# Patient Record
Sex: MALE | Race: OTHER | Employment: FULL TIME | URBAN - METROPOLITAN AREA
[De-identification: names, ages, dates, MRNs, and addresses within clinical notes are randomized per-mention and may not be internally consistent; named-entity substitution may affect disease eponyms.]

---

## 2018-01-08 ENCOUNTER — HOSPITAL ENCOUNTER (EMERGENCY)
Facility: HOSPITAL | Age: 33
Discharge: HOME | End: 2018-01-08
Attending: EMERGENCY MEDICINE

## 2018-01-08 ENCOUNTER — APPOINTMENT (OUTPATIENT)
Dept: CT IMAGING | Facility: HOSPITAL | Age: 33
End: 2018-01-08
Attending: PHYSICIAN ASSISTANT

## 2018-01-08 VITALS
WEIGHT: 215 LBS | HEIGHT: 70 IN | DIASTOLIC BLOOD PRESSURE: 72 MMHG | HEART RATE: 64 BPM | SYSTOLIC BLOOD PRESSURE: 128 MMHG | RESPIRATION RATE: 16 BRPM | OXYGEN SATURATION: 97 % | BODY MASS INDEX: 30.78 KG/M2 | TEMPERATURE: 98 F

## 2018-01-08 DIAGNOSIS — G51.0 BELL'S PALSY: Primary | ICD-10-CM

## 2018-01-08 LAB
ALBUMIN SERPL-MCNC: 3.8 G/DL (ref 3.5–4.8)
ALP LIVER SERPL-CCNC: 79 U/L (ref 45–117)
ALT SERPL-CCNC: 51 U/L (ref 17–63)
AST SERPL-CCNC: 31 U/L (ref 15–41)
BASOPHILS # BLD AUTO: 0.08 X10(3) UL (ref 0–0.1)
BASOPHILS NFR BLD AUTO: 0.6 %
BILIRUB SERPL-MCNC: 0.4 MG/DL (ref 0.1–2)
BUN BLD-MCNC: 8 MG/DL (ref 8–20)
CALCIUM BLD-MCNC: 9.1 MG/DL (ref 8.3–10.3)
CHLORIDE: 107 MMOL/L (ref 101–111)
CO2: 26 MMOL/L (ref 22–32)
CREAT BLD-MCNC: 0.88 MG/DL (ref 0.7–1.3)
EOSINOPHIL # BLD AUTO: 0.49 X10(3) UL (ref 0–0.3)
EOSINOPHIL NFR BLD AUTO: 3.9 %
ERYTHROCYTE [DISTWIDTH] IN BLOOD BY AUTOMATED COUNT: 12 % (ref 11.5–16)
GLUCOSE BLD-MCNC: 103 MG/DL (ref 70–99)
HCT VFR BLD AUTO: 49.3 % (ref 37–53)
HGB BLD-MCNC: 17 G/DL (ref 13–17)
IMMATURE GRANULOCYTE COUNT: 0.04 X10(3) UL (ref 0–1)
IMMATURE GRANULOCYTE RATIO %: 0.3 %
LYMPHOCYTES # BLD AUTO: 2.06 X10(3) UL (ref 0.9–4)
LYMPHOCYTES NFR BLD AUTO: 16.4 %
M PROTEIN MFR SERPL ELPH: 7.2 G/DL (ref 6.1–8.3)
MCH RBC QN AUTO: 30.2 PG (ref 27–33.2)
MCHC RBC AUTO-ENTMCNC: 34.5 G/DL (ref 31–37)
MCV RBC AUTO: 87.6 FL (ref 80–99)
MONOCYTES # BLD AUTO: 0.69 X10(3) UL (ref 0.1–0.6)
MONOCYTES NFR BLD AUTO: 5.5 %
NEUTROPHIL ABS PRELIM: 9.22 X10 (3) UL (ref 1.3–6.7)
NEUTROPHILS # BLD AUTO: 9.22 X10(3) UL (ref 1.3–6.7)
NEUTROPHILS NFR BLD AUTO: 73.3 %
PLATELET # BLD AUTO: 238 10(3)UL (ref 150–450)
POTASSIUM SERPL-SCNC: 4.2 MMOL/L (ref 3.6–5.1)
RBC # BLD AUTO: 5.63 X10(6)UL (ref 4.3–5.7)
RED CELL DISTRIBUTION WIDTH-SD: 38.3 FL (ref 35.1–46.3)
SODIUM SERPL-SCNC: 140 MMOL/L (ref 136–144)
WBC # BLD AUTO: 12.6 X10(3) UL (ref 4–13)

## 2018-01-08 PROCEDURE — 36415 COLL VENOUS BLD VENIPUNCTURE: CPT

## 2018-01-08 PROCEDURE — 99284 EMERGENCY DEPT VISIT MOD MDM: CPT

## 2018-01-08 PROCEDURE — 99285 EMERGENCY DEPT VISIT HI MDM: CPT

## 2018-01-08 PROCEDURE — 85025 COMPLETE CBC W/AUTO DIFF WBC: CPT | Performed by: PHYSICIAN ASSISTANT

## 2018-01-08 PROCEDURE — 70450 CT HEAD/BRAIN W/O DYE: CPT | Performed by: PHYSICIAN ASSISTANT

## 2018-01-08 PROCEDURE — 80053 COMPREHEN METABOLIC PANEL: CPT | Performed by: PHYSICIAN ASSISTANT

## 2018-01-08 RX ORDER — PREDNISONE 20 MG/1
60 TABLET ORAL DAILY
Qty: 15 TABLET | Refills: 0 | Status: SHIPPED | OUTPATIENT
Start: 2018-01-08 | End: 2018-01-13

## 2018-01-08 RX ORDER — FAMCICLOVIR 500 MG/1
500 TABLET, FILM COATED ORAL 3 TIMES DAILY
Qty: 21 TABLET | Refills: 0 | Status: SHIPPED | OUTPATIENT
Start: 2018-01-08 | End: 2018-01-15

## 2018-01-08 NOTE — ED INITIAL ASSESSMENT (HPI)
Numbness/tingling to left side of face. Started yest AM  Pt feels discomfort to left side of neck and around left ear  Unable to  Raise left eyebrow -   Hand grasps are equal / bilat.    Smile is symmetrical  No arm drift    Pt started taking prednisone yes

## 2018-01-08 NOTE — ED PROVIDER NOTES
Patient Seen in: [facility] Emergency Department    History   Patient presents with:  Numbness Weakness (neurologic)  Bell's Palsy    Stated Complaint: bells palsy    HPI    Thuan Hallmark is a 66-year-old male who presents today from an urgent care diagnos Effort normal and breath sounds normal.   Abdominal: Soft. Bowel sounds are normal.   Neurological: He is alert and oriented to person, place, and time. He has normal strength. No sensory deficit. He displays a negative Romberg sign.    Patient is unable to which includes the Dose Index Registry. PATIENT STATED HISTORY: (As transcribed by Technologist)  Numbness to left side of face with fever and headache    FINDINGS:  VENTRICLES/SULCI:  Ventricles and sulci are normal in size.   INTRACRANIAL:  There are no PM    START taking these medications    predniSONE 20 MG Oral Tab  Take 3 tablets (60 mg total) by mouth daily. , Print Script, Disp-15 tablet, R-0    Famciclovir 500 MG Oral Tab  Take 1 tablet (500 mg total) by mouth 3 (three) times daily. , Print Script, D

## 2018-01-08 NOTE — ED PROVIDER NOTES
I reviewed that chart and discussed the case. I have examined the patient and noted findings of Bell's palsy. The patient's symptoms started yesterday. The patient states that he is unable to raise his left eyebrow.   Some tingling in his left side of hi of acute inflammation. SKULL:             No evidence for fracture or osseous abnormality. OTHER:             None. CONCLUSION:  No acute intracranial abnormality is seen. If clinical symptoms persist recommend MRI.     Dictated by: Parul Hanson MD on 1

## 2018-01-11 ENCOUNTER — TELEPHONE (OUTPATIENT)
Dept: FAMILY MEDICINE CLINIC | Facility: CLINIC | Age: 33
End: 2018-01-11

## 2018-01-11 NOTE — TELEPHONE ENCOUNTER
Cliff Foote DO at 1/8/2018  2:23 PM     Status: Signed      Pls call and see if pt needs PCP in the area for follow up of ER visit today. Seen at ER for Mellott' Palsy.     Routed to front office to contact patient to offer ER f/u

## 2018-02-23 PROBLEM — F29 PSYCHOSIS (HCC): Status: ACTIVE | Noted: 2018-02-23

## 2018-02-23 NOTE — ED PROVIDER NOTES
LEFT V. MAIL FOR ADMIN @ [facility] #:  [phone], x [phone] )    INFO FAXED [phone] & [phone]    AWAITING C/B.

## 2018-02-23 NOTE — ED NOTES
Patient is calm and cooperative at this time, watching television. Verbalizes decrease in anxiety following Ativan.
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
## 2018-02-23 NOTE — ED PROVIDER NOTES
Patient Seen in: [facility] Emergency Department    History   Patient presents with:  Eval-P (psychiatric)    Stated Complaint: eval p, increasing anxiety/depression, started on sertraline by PMD yesterday    HPI    Patient presents with suicidal an guarding, normal active bowel sounds, no CVA tenderness. Extremities: No CCE. Skin: Warm and dry.   Neurologic: Nonfocal.    ED Course     Labs Reviewed   CBC W/ DIFFERENTIAL - Abnormal; Notable for the following:        Result Value    RBC 5.72 (*)     H Discharge Medication List
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
## 2018-02-23 NOTE — ED NOTES
Patient undressed and dressed into behavioral health gown. All belongings including pants, underwear, socks, shoes, shirt, sweatshirt, and phone placed in security bag #[id number] and locked in locker outside B4.  Patient and family given education on seclusi
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
## 2018-02-24 PROBLEM — F41.8 OTHER SPECIFIED ANXIETY DISORDERS: Status: ACTIVE | Noted: 2018-02-24

## 2018-02-24 PROBLEM — F32.2 CURRENT SEVERE EPISODE OF MAJOR DEPRESSIVE DISORDER WITHOUT PSYCHOTIC FEATURES WITHOUT PRIOR EPISODE (HCC): Status: ACTIVE | Noted: 2018-02-23

## 2018-02-24 NOTE — ED NOTES
Report from [facility] received. Pt remains resting comfortably and awaiting ed bls to facility.

## 2018-02-24 NOTE — ED NOTES
Patient updated on transfer and timeframe. Report given to [facility] from [facility].

## 2018-02-24 NOTE — ED NOTES
Called [facility] regarding late transfer. .. Crew has just arrived and is dropping off a pt in the ER.  Crew stated that they are unaware of pickup in the ER

## 2018-03-20 PROBLEM — F33.2 MAJOR DEPRESSIVE DISORDER, RECURRENT EPISODE, SEVERE (HCC): Status: ACTIVE | Noted: 2018-03-20

## 2018-03-22 PROBLEM — F32.2 CURRENT SEVERE EPISODE OF MAJOR DEPRESSIVE DISORDER WITHOUT PSYCHOTIC FEATURES WITHOUT PRIOR EPISODE (HCC): Status: RESOLVED | Noted: 2018-02-23 | Resolved: 2018-03-22

## 2018-03-22 PROBLEM — F32.9 MAJOR DEPRESSION: Status: ACTIVE | Noted: 2018-03-20

## 2018-11-08 ENCOUNTER — HOSPITAL ENCOUNTER (EMERGENCY)
Facility: HOSPITAL | Age: 33
Discharge: HOME | End: 2018-11-08
Attending: EMERGENCY MEDICINE

## 2018-11-08 ENCOUNTER — APPOINTMENT (OUTPATIENT)
Dept: CT IMAGING | Facility: HOSPITAL | Age: 33
End: 2018-11-08
Attending: EMERGENCY MEDICINE

## 2018-11-08 VITALS
RESPIRATION RATE: 17 BRPM | OXYGEN SATURATION: 98 % | BODY MASS INDEX: 41.52 KG/M2 | WEIGHT: 290 LBS | TEMPERATURE: 97 F | DIASTOLIC BLOOD PRESSURE: 82 MMHG | HEART RATE: 55 BPM | SYSTOLIC BLOOD PRESSURE: 128 MMHG | HEIGHT: 70 IN

## 2018-11-08 DIAGNOSIS — R10.9 ABDOMINAL PAIN OF UNKNOWN ETIOLOGY: Primary | ICD-10-CM

## 2018-11-08 PROCEDURE — 81003 URINALYSIS AUTO W/O SCOPE: CPT | Performed by: EMERGENCY MEDICINE

## 2018-11-08 PROCEDURE — 80053 COMPREHEN METABOLIC PANEL: CPT | Performed by: EMERGENCY MEDICINE

## 2018-11-08 PROCEDURE — 83690 ASSAY OF LIPASE: CPT | Performed by: EMERGENCY MEDICINE

## 2018-11-08 PROCEDURE — 96361 HYDRATE IV INFUSION ADD-ON: CPT

## 2018-11-08 PROCEDURE — 80053 COMPREHEN METABOLIC PANEL: CPT

## 2018-11-08 PROCEDURE — 85025 COMPLETE CBC W/AUTO DIFF WBC: CPT | Performed by: EMERGENCY MEDICINE

## 2018-11-08 PROCEDURE — 74177 CT ABD & PELVIS W/CONTRAST: CPT | Performed by: EMERGENCY MEDICINE

## 2018-11-08 PROCEDURE — 96374 THER/PROPH/DIAG INJ IV PUSH: CPT

## 2018-11-08 PROCEDURE — 85025 COMPLETE CBC W/AUTO DIFF WBC: CPT

## 2018-11-08 PROCEDURE — 99284 EMERGENCY DEPT VISIT MOD MDM: CPT

## 2018-11-08 PROCEDURE — 83690 ASSAY OF LIPASE: CPT

## 2018-11-08 PROCEDURE — 99285 EMERGENCY DEPT VISIT HI MDM: CPT

## 2018-11-08 RX ORDER — DICYCLOMINE HCL 20 MG
20 TABLET ORAL 4 TIMES DAILY PRN
Qty: 30 TABLET | Refills: 0 | Status: SHIPPED | OUTPATIENT
Start: 2018-11-08 | End: 2018-12-08

## 2018-11-08 RX ORDER — PANTOPRAZOLE SODIUM 40 MG/1
40 TABLET, DELAYED RELEASE ORAL DAILY
Qty: 30 TABLET | Refills: 0 | OUTPATIENT
Start: 2018-11-08 | End: 2018-11-08

## 2018-11-08 RX ORDER — PANTOPRAZOLE SODIUM 40 MG/1
40 TABLET, DELAYED RELEASE ORAL DAILY
Qty: 30 TABLET | Refills: 0 | Status: SHIPPED | OUTPATIENT
Start: 2018-11-08 | End: 2018-12-08

## 2018-11-08 RX ORDER — ONDANSETRON 4 MG/1
4 TABLET, ORALLY DISINTEGRATING ORAL EVERY 4 HOURS PRN
Qty: 10 TABLET | Refills: 0 | OUTPATIENT
Start: 2018-11-08 | End: 2018-11-08

## 2018-11-08 RX ORDER — OMEPRAZOLE 20 MG/1
20 CAPSULE, DELAYED RELEASE ORAL
Qty: 30 CAPSULE | Refills: 0 | Status: SHIPPED | OUTPATIENT
Start: 2018-11-08

## 2018-11-08 RX ORDER — DICYCLOMINE HCL 20 MG
20 TABLET ORAL 4 TIMES DAILY PRN
Qty: 30 TABLET | Refills: 0 | OUTPATIENT
Start: 2018-11-08 | End: 2018-11-08

## 2018-11-08 RX ORDER — SODIUM CHLORIDE 9 MG/ML
INJECTION, SOLUTION INTRAVENOUS CONTINUOUS
Status: DISCONTINUED | OUTPATIENT
Start: 2018-11-08 | End: 2018-11-08

## 2018-11-08 RX ORDER — ONDANSETRON 2 MG/ML
4 INJECTION INTRAMUSCULAR; INTRAVENOUS ONCE
Status: COMPLETED | OUTPATIENT
Start: 2018-11-08 | End: 2018-11-08

## 2018-11-08 RX ORDER — ONDANSETRON 4 MG/1
4 TABLET, ORALLY DISINTEGRATING ORAL EVERY 4 HOURS PRN
Qty: 10 TABLET | Refills: 0 | Status: SHIPPED | OUTPATIENT
Start: 2018-11-08 | End: 2018-11-15

## 2018-11-08 RX ORDER — OMEPRAZOLE 20 MG/1
20 CAPSULE, DELAYED RELEASE ORAL
COMMUNITY
End: 2018-11-08

## 2018-11-08 NOTE — ED INITIAL ASSESSMENT (HPI)
Patient to ED c/o right sided abdominal pain and right back pain for 2 months. Reports today it is worse. Saw a doctor on Friday, US and x-ray was completed on Monday, was told everything is ok.   + nausea, denies vomiting. Denies urinary complaints.

## 2018-11-08 NOTE — ED PROVIDER NOTES
Patient Seen in: [facility] Emergency Department    History   Patient presents with:  Abdomen/Flank Pain (GI/)    Stated Complaint: abdominal pain x 2 months, seen by doctor for same on Monday    HPI    2 months of right-sided abdominal pain- outp palpation of the right abdomen and over the right lower quadrant over McBurney's point he is noted to have some rebound.   He does not have any significant CVA tenderness no significant tenderness in the left lower abdomen    ED Course     Labs Reviewed   C Technologist)  Patient with     right upper quadrant pain, right back pain x 2 months, nausea.            CONTRAST USED:  100cc of Omnipaque 350         FINDINGS:      LIVER:  Findings suggestive of fatty infiltration of the liver with focal     areas of fa Clinical Impression:  Abdominal pain of unknown etiology  (primary encounter diagnosis)    Disposition:  Discharge  11/8/2018  4:08 pm    Follow-up:  [facility] Emergency Department  [facility]  [address] [id number]  [phone]

## 2019-04-11 ENCOUNTER — HOSPITAL ENCOUNTER (EMERGENCY)
Facility: HOSPITAL | Age: 34
Discharge: HOME | End: 2019-04-11
Attending: EMERGENCY MEDICINE

## 2019-04-11 ENCOUNTER — APPOINTMENT (OUTPATIENT)
Dept: ULTRASOUND IMAGING | Facility: HOSPITAL | Age: 34
End: 2019-04-11
Attending: EMERGENCY MEDICINE

## 2019-04-11 ENCOUNTER — APPOINTMENT (OUTPATIENT)
Dept: GENERAL RADIOLOGY | Facility: HOSPITAL | Age: 34
End: 2019-04-11
Attending: EMERGENCY MEDICINE

## 2019-04-11 VITALS
WEIGHT: 220 LBS | HEART RATE: 63 BPM | HEIGHT: 68 IN | SYSTOLIC BLOOD PRESSURE: 124 MMHG | DIASTOLIC BLOOD PRESSURE: 72 MMHG | OXYGEN SATURATION: 97 % | RESPIRATION RATE: 16 BRPM | BODY MASS INDEX: 33.34 KG/M2 | TEMPERATURE: 98 F

## 2019-04-11 DIAGNOSIS — R10.11 ABDOMINAL PAIN, RIGHT UPPER QUADRANT: Primary | ICD-10-CM

## 2019-04-11 PROCEDURE — 85025 COMPLETE CBC W/AUTO DIFF WBC: CPT | Performed by: EMERGENCY MEDICINE

## 2019-04-11 PROCEDURE — 96374 THER/PROPH/DIAG INJ IV PUSH: CPT | Performed by: EMERGENCY MEDICINE

## 2019-04-11 PROCEDURE — 80053 COMPREHEN METABOLIC PANEL: CPT | Performed by: EMERGENCY MEDICINE

## 2019-04-11 PROCEDURE — 99285 EMERGENCY DEPT VISIT HI MDM: CPT | Performed by: EMERGENCY MEDICINE

## 2019-04-11 PROCEDURE — 99284 EMERGENCY DEPT VISIT MOD MDM: CPT | Performed by: EMERGENCY MEDICINE

## 2019-04-11 PROCEDURE — 71046 X-RAY EXAM CHEST 2 VIEWS: CPT | Performed by: EMERGENCY MEDICINE

## 2019-04-11 PROCEDURE — 81003 URINALYSIS AUTO W/O SCOPE: CPT | Performed by: EMERGENCY MEDICINE

## 2019-04-11 PROCEDURE — 83690 ASSAY OF LIPASE: CPT | Performed by: EMERGENCY MEDICINE

## 2019-04-11 PROCEDURE — 76700 US EXAM ABDOM COMPLETE: CPT | Performed by: EMERGENCY MEDICINE

## 2019-04-11 RX ORDER — IBUPROFEN 600 MG/1
600 TABLET ORAL EVERY 8 HOURS PRN
Qty: 30 TABLET | Refills: 0 | Status: SHIPPED | OUTPATIENT
Start: 2019-04-11 | End: 2019-04-21

## 2019-04-11 RX ORDER — RANITIDINE 150 MG/1
150 CAPSULE ORAL EVERY EVENING
COMMUNITY

## 2019-04-11 RX ORDER — KETOROLAC TROMETHAMINE 30 MG/ML
30 INJECTION, SOLUTION INTRAMUSCULAR; INTRAVENOUS ONCE
Status: COMPLETED | OUTPATIENT
Start: 2019-04-11 | End: 2019-04-11

## 2019-04-11 NOTE — ED PROVIDER NOTES
Patient Seen in: [facility] Emergency Department    History   Patient presents with:  Abdomen/Flank Pain (GI/)    Stated Complaint: flank pain    HPI    60-year-old male presents emergency department complaining of right upper quadrant pain radiat crackles no wheezes no accessory muscle use  Abdomen: Patient has right upper quadrant tenderness positive Jenkins sign also has midepigastric tenderness, no rebound no guarding  no hepatosplenomegaly bowel sounds are present , no pulsatile mass  Extremitie Appearance of the liver consistent with fatty infiltration with areas of focal fatty sparing. This is also shown on a prior CT scan. 2. Bowel gas obscuration of the pancreas.      Dictated by: Darlene Garcia MD on 4/11/2019 at 16:31     Approved by: Josselyn Alonso

## 2019-04-11 NOTE — ED INITIAL ASSESSMENT (HPI)
Since \"last Monday with right upper quadrant that radiates to back\" worse when he feels full after eating or takes a deep breath. Denies vomiting or diarrhea. Complaint of some nausea. Taking Ibuprofen for the pain without much relief.   Denies trauma,

## (undated) NOTE — ED AVS SNAPSHOT
Laron Brady   MRN: [id number]    Department:  [facility] Emergency Department   Date of Visit:  11/8/2018           Disclosure     Insurance plans vary and the physician(s) referred by the ER may not be covered by your plan.  Please conta tell this physician (or your personal doctor if your instructions are to return to your personal doctor) about any new or lasting problems. The primary care or specialist physician will see patients referred from the [facility] Emergency Department.  Dany De Santiago

## (undated) NOTE — ED AVS SNAPSHOT
Markell Mota   MRN: [id number]    Department:  [facility] Emergency Department   Date of Visit:  1/8/2018           Disclosure     Insurance plans vary and the physician(s) referred by the ER may not be covered by your plan.  Please contac tell this physician (or your personal doctor if your instructions are to return to your personal doctor) about any new or lasting problems. The primary care or specialist physician will see patients referred from the [facility] Emergency Department.  Salvadore Skiff

## (undated) NOTE — ED AVS SNAPSHOT
Jayda Mills   MRN: [id number]    Department:  [facility] Emergency Department   Date of Visit:  4/11/2019           Disclosure     Insurance plans vary and the physician(s) referred by the ER may not be covered by your plan.  Please conta tell this physician (or your personal doctor if your instructions are to return to your personal doctor) about any new or lasting problems. The primary care or specialist physician will see patients referred from the [facility] Emergency Department.  Justyna Miller